# Patient Record
Sex: MALE | Race: WHITE
[De-identification: names, ages, dates, MRNs, and addresses within clinical notes are randomized per-mention and may not be internally consistent; named-entity substitution may affect disease eponyms.]

---

## 2018-05-17 ENCOUNTER — HOSPITAL ENCOUNTER (EMERGENCY)
Dept: HOSPITAL 31 - C.ER | Age: 58
Discharge: HOME | End: 2018-05-17
Payer: MEDICAID

## 2018-05-17 VITALS — OXYGEN SATURATION: 100 %

## 2018-05-17 VITALS — RESPIRATION RATE: 18 BRPM | SYSTOLIC BLOOD PRESSURE: 113 MMHG | DIASTOLIC BLOOD PRESSURE: 73 MMHG | TEMPERATURE: 97.3 F

## 2018-05-17 VITALS — HEART RATE: 60 BPM

## 2018-05-17 VITALS — BODY MASS INDEX: 29.2 KG/M2

## 2018-05-17 DIAGNOSIS — S59.912A: Primary | ICD-10-CM

## 2018-05-17 DIAGNOSIS — S69.92XA: ICD-10-CM
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
## 2018-05-17 NOTE — RAD
Left forearm two views 



History: Pain and swelling. 



Comparison: None available. 



Findings: 



Soft tissue swelling seen at level of the distal left forearm. 



Visualized osseous structures appear grossly preserved. 



Degenerative changes at the level of the elbow and wrist joint 

spaces. 



Impression: 



Degenerative changes.  If pain persists, consider further evaluation 

with MRI.

## 2018-05-17 NOTE — C.PDOC
History Of Present Illness


58 year old male presents to the emergency department with complaints of pain 

and swelling in his left wrist/hand area. Patient reports that three weeks ago 

at work, a "1000lb machine" slammed into his wrist, and he reports no relief of 

swelling or pain from OTC medications. Patient denies numbness or tingling. 


Time Seen by Provider: 05/17/18 11:03


Chief Complaint (Nursing): Upper Extremity Problem/Injury


History Per: Patient


History/Exam Limitations: no limitations


Onset/Duration Of Symptoms: Other (3 weeks)


Current Symptoms Are (Timing): Still Present


Quality: "Pain", Other (swelling)





Past Medical History


Reviewed: Historical Data, Nursing Documentation, Vital Signs


Vital Signs: 


 Last Vital Signs











Temp  97.3 F L  05/17/18 11:35


 


Pulse  60   05/17/18 11:35


 


Resp  18   05/17/18 11:35


 


BP  113/73   05/17/18 11:35


 


Pulse Ox  100   05/17/18 12:33














- Medical History


PMH: Gall Bladder Disease, HTN, Hypercholesterolemia


   Denies: Chronic Kidney Disease


Surgical History: Cholecystectomy





- [facility] Procedures








CLOSED ENDOSCOPIC BIOPSY OF LARGE INTESTINE (09/02/15)


ESOPHAGOGASTRODUODENOSCOPY [EGD] W/CLOSED BIOPSY (08/26/15)


LAPAROSCOP LYSIS-PERITONEAL ADHES (08/14/15)


LAPAROSCOPIC CHOLECYSTECTOMY (08/14/15)


LAPAROSCOPIC ROBOTIC ASSISTED PROCEDURE (08/14/15)








Family History: States: No Known Family Hx





- Social History


Hx Tobacco Use: No


Hx Alcohol Use: Yes


Hx Substance Use: No





- Immunization History


Hx Tetanus Toxoid Vaccination: No


Hx Influenza Vaccination: No


Hx Pneumococcal Vaccination: No





Review Of Systems


Musculoskeletal: Positive for: Arm Pain, Hand Pain, Other (hand/wrist swelling)


Neurological: Negative for: Numbness, Other (tingling)





Physical Exam





- Physical Exam


Appears: Non-toxic, No Acute Distress


Skin: Normal Color, Warm


Head: Atraumatic, Normacephalic


Extremity: Normal ROM (full range of motion at left wrist), Tenderness (ranging 

from the left distal radius to proximal metacarpal of the left hand), Swelling (

swelling to the distal 1/3 of the radius)


Extremity: Left: Normal ROM (wrist)


Pulses: Left Radial: Normal, Right Radial: Normal


Neurological/Psych: Oriented x3, Normal Speech, Normal Cognition





ED Course And Treatment


O2 Sat by Pulse Oximetry: 100 (RA)


Pulse Ox Interpretation: Normal





- Other Rad


  ** XR Left Forearm


X-Ray: Viewed By Me, Read By Radiologist


Interpretation: Left forearm two views.  History: Pain and swelling.  Comparison

: None available.  Findings:  Soft tissue swelling seen at level of the distal 

left forearm.  Visualized osseous structures appear grossly preserved.  

Degenerative changes at the level of the elbow and wrist joint spaces.  

Impression:  Degenerative changes.  If pain persists, consider further 

evaluation with MRI.





  ** XR Left Wrist


X-Ray: Viewed By Me, Read By Radiologist


Interpretation: Left wrist four views.  History: Proximal 2nd metacarpal pain.  

Comparison: None available.  Findings:  Narrowing of the radiocarpal joint 

space.  Mild negative ulnar variance.  Mild productive change at the base of 

the 2nd metacarpal, nonspecific.  Impression:  Degenerative changes.  If pain 

persists, consider MRI.


Progress Note: Plan:  Motrin 600mg PO.  XR Left Forearm.  XR Left Wrist 3 Views





Medical Decision Making


Medical Decision Making: 





xrays reviewed, ?fx of trapezoid, preformed wrist splint applied,  no forearm 

fx noted. d/c with nsaids and hand f/u





Disposition


Counseled Patient/Family Regarding: Studies Performed, Diagnosis, Need For 

Followup, Rx Given





- Disposition


Referrals: 


Unique Webster MD [Staff Provider] - 


Disposition: HOME/ ROUTINE


Disposition Time: 12:31


Condition: GOOD


Instructions:  Common Wrist Injuries (DC)


Forms:  General Discharge Instructions, [facility] (English), Work Excuse





- Clinical Impression


Clinical Impression: 


 Injury of left forearm and wrist








- PA / NP / Resident Statement


MD/DO has reviewed & agrees with the documentation as recorded.





- Scribe Statement


The provider has reviewed the documentation as recorded by the Scribe (Lex Velez)


All medical record entries made by the Scribe were at my direction and 

personally dictated by me. I have reviewed the chart and agree that the record 

accurately reflects my personal performance of the history, physical exam, 

medical decision making, and the department course for this patient. I have 

also personally directed, reviewed, and agree with the discharge instructions 

and disposition.

## 2018-05-17 NOTE — RAD
Left wrist four views 



History: Proximal 2nd metacarpal pain. 



Comparison: None available. 



Findings: 



Narrowing of the radiocarpal joint space. 



Mild negative ulnar variance. 



Mild productive change at the base of the 2nd metacarpal, 

nonspecific. 



Impression: 



Degenerative changes.  If pain persists, consider MRI.